# Patient Record
(demographics unavailable — no encounter records)

---

## 2025-04-09 NOTE — HISTORY OF PRESENT ILLNESS
[FreeTextEntry1] : np spots [de-identified] : Referred by: Dr. Barger   Ms. ALVIN VILLA  is a 58 year old F here for evaluation of below  #bump on L nose x a few wks #spot on R arm #mult spots on body, req fbse   no personal or family h/o skin cancer Social Hx: np. likes to scuba dive w/ friends

## 2025-04-09 NOTE — CONSULT LETTER
[Dear  ___] : Dear  [unfilled], [Consult Letter:] : I had the pleasure of evaluating your patient, [unfilled]. [Please see my note below.] : Please see my note below. [Consult Closing:] : Thank you very much for allowing me to participate in the care of this patient.  If you have any questions, please do not hesitate to contact me. [Sincerely,] : Sincerely, [FreeTextEntry3] : Nydia Dudley MD Department of Dermatology [facility]

## 2025-04-09 NOTE — PHYSICAL EXAM
[FreeTextEntry3] : The patient is well-appearing, in no acute distress, alert and oriented x 3. Mood and affect are normal. A complete cutaneous examination of the scalp, face, neck, chest, abdomen, back, bilateral arms, bilateral legs, buttocks, digits, nails, eyelids, conjunctiva and lips reveals the following significant findings:  few inflam papules on nose and few scattered on face multiple symmetric brown macules and papules, multiple scattered waxy stuck on papules on face, trunk, extremities lentigines

## 2025-04-09 NOTE — ASSESSMENT
[FreeTextEntry1] : acne, mild inflammatory flaring discussed nature, chronicity and unpredictable course start clinda-bpo BID to AA PRN flares  SK, including on R arm lentigines nevi - Counseled about clinically and dermatoscopically benign lesions - No treatment indicated at this time - Counseled patient to notify us of any changes  - TBSE performed today - Advised sun protection, broad spectrum SPF 30 or higher daily and reapply often, sun protective clothing - Counseled patient to monitor for changes - rtc q1yr for repeat skin exam or sooner if new/concerning lesion